# Patient Record
Sex: MALE | Race: WHITE | NOT HISPANIC OR LATINO | URBAN - NONMETROPOLITAN AREA
[De-identification: names, ages, dates, MRNs, and addresses within clinical notes are randomized per-mention and may not be internally consistent; named-entity substitution may affect disease eponyms.]

---

## 2024-07-30 ENCOUNTER — OFFICE (OUTPATIENT)
Dept: URBAN - NONMETROPOLITAN AREA CLINIC 1 | Facility: CLINIC | Age: 64
End: 2024-07-30
Payer: COMMERCIAL

## 2024-07-30 VITALS
DIASTOLIC BLOOD PRESSURE: 85 MMHG | HEIGHT: 67 IN | SYSTOLIC BLOOD PRESSURE: 147 MMHG | WEIGHT: 175 LBS | HEART RATE: 71 BPM

## 2024-07-30 DIAGNOSIS — R13.10 DYSPHAGIA, UNSPECIFIED: ICD-10-CM

## 2024-07-30 DIAGNOSIS — K21.9 GASTRO-ESOPHAGEAL REFLUX DISEASE WITHOUT ESOPHAGITIS: ICD-10-CM

## 2024-07-30 DIAGNOSIS — Z79.1 LONG TERM (CURRENT) USE OF NON-STEROIDAL ANTI-INFLAMMATORIES: ICD-10-CM

## 2024-07-30 DIAGNOSIS — Z72.0 TOBACCO USE: ICD-10-CM

## 2024-07-30 DIAGNOSIS — R19.5 OTHER FECAL ABNORMALITIES: ICD-10-CM

## 2024-07-30 PROCEDURE — 99204 OFFICE O/P NEW MOD 45 MIN: CPT | Performed by: NURSE PRACTITIONER

## 2024-07-30 NOTE — SERVICEHPINOTES
Patient with a history of COPD, hypertension, GERD, anxiety presents to the clinic today for positive Cologuard.  He has never had a colonoscopy before in the past.  Positive Cologuard on 04/24 via PCP.  He denies any melena, hematochezia, nausea vomiting diarrhea, abdominal pain, change in bowel habits, unintentional weight loss, or fever.  He has a good appetite and has at least 1 bowel movement daily and denies straining with defecation.  He does report chronic GERD in which he takes omeprazole 40 mg p.r.n. signs and symptoms.  He also complains of dysphagia mid chest worse with eating meat.  He admits taking daily BC powder for chronic aches and pains and has for many years.  Denies any hematemesis.  He is long-term cigarette smoker and smokes 1 pack per day and has for many years.  He denies a family history of CRC.  Denies cardiac stents, anticoagulation therapy, CKD, or supplemental oxygenation use.

## 2024-07-30 NOTE — SERVICENOTES
Risks of procedure explained to patient he wishes to proceed 
Bowel prep prescribed instructions given to patient 
EGD with dilation for dysphagia, chronic GERD, long-term BC powder and tobacco use-r/o stricture, Jung's, pud, 
Colonoscopy for crc for age and positive Cologuard result-has never had routine screening colonoscopy in the past 
Increase fiber and water intake daily
Tobacco cessation discussed
Stop BC powder use and avoid all NSAIDs
Swallowing precautions discussed with patient
Continue ppi as currently prescribed by PCP.

## 2024-10-23 ENCOUNTER — AMBULATORY SURGICAL CENTER (OUTPATIENT)
Dept: URBAN - NONMETROPOLITAN AREA SURGERY 1 | Facility: SURGERY | Age: 64
End: 2024-10-23
Payer: COMMERCIAL

## 2024-10-23 VITALS
WEIGHT: 169.5 LBS | RESPIRATION RATE: 19 BRPM | RESPIRATION RATE: 19 BRPM | HEART RATE: 76 BPM | RESPIRATION RATE: 17 BRPM | SYSTOLIC BLOOD PRESSURE: 122 MMHG | OXYGEN SATURATION: 98 % | DIASTOLIC BLOOD PRESSURE: 73 MMHG | OXYGEN SATURATION: 99 % | TEMPERATURE: 98.4 F | DIASTOLIC BLOOD PRESSURE: 72 MMHG | HEIGHT: 67 IN | TEMPERATURE: 98.4 F | DIASTOLIC BLOOD PRESSURE: 66 MMHG | SYSTOLIC BLOOD PRESSURE: 118 MMHG | DIASTOLIC BLOOD PRESSURE: 81 MMHG | RESPIRATION RATE: 17 BRPM | HEART RATE: 72 BPM | RESPIRATION RATE: 16 BRPM | WEIGHT: 169.5 LBS | HEART RATE: 67 BPM | DIASTOLIC BLOOD PRESSURE: 77 MMHG | DIASTOLIC BLOOD PRESSURE: 66 MMHG | SYSTOLIC BLOOD PRESSURE: 135 MMHG | HEART RATE: 72 BPM | SYSTOLIC BLOOD PRESSURE: 121 MMHG | TEMPERATURE: 98.4 F | HEART RATE: 67 BPM | SYSTOLIC BLOOD PRESSURE: 136 MMHG | DIASTOLIC BLOOD PRESSURE: 73 MMHG | WEIGHT: 169.5 LBS | HEART RATE: 76 BPM | RESPIRATION RATE: 15 BRPM | SYSTOLIC BLOOD PRESSURE: 136 MMHG | SYSTOLIC BLOOD PRESSURE: 135 MMHG | HEIGHT: 67 IN | DIASTOLIC BLOOD PRESSURE: 77 MMHG | RESPIRATION RATE: 16 BRPM | OXYGEN SATURATION: 99 % | SYSTOLIC BLOOD PRESSURE: 122 MMHG | DIASTOLIC BLOOD PRESSURE: 73 MMHG | SYSTOLIC BLOOD PRESSURE: 118 MMHG | TEMPERATURE: 98.8 F | SYSTOLIC BLOOD PRESSURE: 121 MMHG | HEART RATE: 71 BPM | HEART RATE: 78 BPM | RESPIRATION RATE: 15 BRPM | HEART RATE: 78 BPM | HEIGHT: 67 IN | DIASTOLIC BLOOD PRESSURE: 81 MMHG | RESPIRATION RATE: 15 BRPM | TEMPERATURE: 98.8 F | OXYGEN SATURATION: 98 % | SYSTOLIC BLOOD PRESSURE: 121 MMHG | SYSTOLIC BLOOD PRESSURE: 118 MMHG | HEART RATE: 76 BPM | HEART RATE: 67 BPM | OXYGEN SATURATION: 98 % | HEART RATE: 78 BPM | RESPIRATION RATE: 19 BRPM | RESPIRATION RATE: 17 BRPM | SYSTOLIC BLOOD PRESSURE: 122 MMHG | HEART RATE: 71 BPM | TEMPERATURE: 98.8 F | HEART RATE: 72 BPM | DIASTOLIC BLOOD PRESSURE: 66 MMHG | DIASTOLIC BLOOD PRESSURE: 77 MMHG | DIASTOLIC BLOOD PRESSURE: 81 MMHG | DIASTOLIC BLOOD PRESSURE: 72 MMHG | SYSTOLIC BLOOD PRESSURE: 136 MMHG | SYSTOLIC BLOOD PRESSURE: 135 MMHG | DIASTOLIC BLOOD PRESSURE: 72 MMHG | RESPIRATION RATE: 16 BRPM | OXYGEN SATURATION: 99 % | HEART RATE: 71 BPM

## 2024-10-23 DIAGNOSIS — R13.10 DYSPHAGIA, UNSPECIFIED: ICD-10-CM

## 2024-10-23 DIAGNOSIS — Z12.11 ENCOUNTER FOR SCREENING FOR MALIGNANT NEOPLASM OF COLON: ICD-10-CM

## 2024-10-23 DIAGNOSIS — K31.89 OTHER DISEASES OF STOMACH AND DUODENUM: ICD-10-CM

## 2024-10-23 DIAGNOSIS — R19.5 OTHER FECAL ABNORMALITIES: ICD-10-CM

## 2024-10-23 DIAGNOSIS — D12.3 BENIGN NEOPLASM OF TRANSVERSE COLON: ICD-10-CM

## 2024-10-23 DIAGNOSIS — D12.0 BENIGN NEOPLASM OF CECUM: ICD-10-CM

## 2024-10-23 DIAGNOSIS — K20.90 ESOPHAGITIS, UNSPECIFIED WITHOUT BLEEDING: ICD-10-CM

## 2024-10-23 DIAGNOSIS — K57.30 DIVERTICULOSIS OF LARGE INTESTINE WITHOUT PERFORATION OR ABS: ICD-10-CM

## 2024-10-23 PROBLEM — K62.89 OTHER SPECIFIED DISEASES OF ANUS AND RECTUM: Status: ACTIVE | Noted: 2024-10-23

## 2024-10-23 PROBLEM — K20.80 OTHER ESOPHAGITIS WITHOUT BLEEDING: Status: ACTIVE | Noted: 2024-10-23

## 2024-10-23 PROBLEM — K25.9 GASTRIC ULCER, UNSPECIFIED AS ACUTE OR CHRONIC, WITHOUT HEMO: Status: ACTIVE | Noted: 2024-10-23

## 2024-10-23 PROCEDURE — 45378 DIAGNOSTIC COLONOSCOPY: CPT | Mod: 51 | Performed by: STUDENT IN AN ORGANIZED HEALTH CARE EDUCATION/TRAINING PROGRAM

## 2024-10-23 PROCEDURE — 43239 EGD BIOPSY SINGLE/MULTIPLE: CPT | Mod: 51 | Performed by: STUDENT IN AN ORGANIZED HEALTH CARE EDUCATION/TRAINING PROGRAM

## 2024-10-23 PROCEDURE — 45378 DIAGNOSTIC COLONOSCOPY: CPT | Mod: PT | Performed by: STUDENT IN AN ORGANIZED HEALTH CARE EDUCATION/TRAINING PROGRAM

## 2024-10-23 PROCEDURE — 43239 EGD BIOPSY SINGLE/MULTIPLE: CPT | Mod: PT | Performed by: STUDENT IN AN ORGANIZED HEALTH CARE EDUCATION/TRAINING PROGRAM

## 2024-10-23 RX ADMIN — PROPOFOL 500 MG: 10 INJECTION, EMULSION INTRAVENOUS at 13:31

## 2024-10-23 NOTE — SERVICENOTES
If dysphagia not improved with PPI 40 mg bid for 90 days, can consider dilation vs manometry based on EGD findings after healing. Rx Sent

## 2024-10-23 NOTE — SERVICEHPINOTES
Patient here for colonoscopy for positive Cologuard and EGD for dysphagia to solids.  He has never had a colonoscopy before in the past.  Positive Cologuard on 04/24 via PCP.  He denies any melena, hematochezia, nausea vomiting diarrhea, abdominal pain, change in bowel habits, unintentional weight loss, or fever.  He has a good appetite and has at least 1 bowel movement daily and denies straining with defecation.   He also complains of dysphagia mid chest worse with eating meat.  He admits taking daily BC powder for chronic aches and pains and has for many years.  Denies any hematemesis.  He is long-term cigarette smoker and smokes 1 pack per day and has for many years.  He denies a family history of CRC.  Denies cardiac stents, anticoagulation therapy, CKD, or supplemental oxygenation use.

## 2024-12-14 PROBLEM — K63.5 POLYP OF COLON: Status: ACTIVE | Noted: 2024-10-23
